# Patient Record
(demographics unavailable — no encounter records)

---

## 2025-04-02 NOTE — DISCUSSION/SUMMARY
[FreeTextEntry1] : 25 yo G0, annual exam.  - f/up pap - condoms for contraception - return to office 1yr annual exam or PRN

## 2025-04-02 NOTE — HISTORY OF PRESENT ILLNESS
[FreeTextEntry1] : 23 yo G0 w/ LMP 3/12/25 here for annual exam/to establish care. Previously seeing Dr. Lazcano. Menses regular, not heavy or painful. Sexually active, uses condoms consistently. No abdominal pain or urinary complaints.  Graduated college, still looking for job in her field  Last pap 3yrs ago, normal per patient  Ob hx: G0 GYN hx denies cysts, fibroids, abnormal paps, STIs PMH denies meds none NKDA PSH denies social denies fam hx paternal and maternal aunt breast cancer